# Patient Record
Sex: FEMALE | Race: WHITE
[De-identification: names, ages, dates, MRNs, and addresses within clinical notes are randomized per-mention and may not be internally consistent; named-entity substitution may affect disease eponyms.]

---
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
## 2025-01-14 ENCOUNTER — OFFICE VISIT (OUTPATIENT)
Dept: URGENT CARE | Facility: CLINIC | Age: 20
End: 2025-01-14
Payer: MEDICAID

## 2025-01-14 VITALS
BODY MASS INDEX: 21.66 KG/M2 | HEART RATE: 66 BPM | HEIGHT: 67 IN | RESPIRATION RATE: 16 BRPM | OXYGEN SATURATION: 100 % | SYSTOLIC BLOOD PRESSURE: 112 MMHG | WEIGHT: 138 LBS | TEMPERATURE: 98.3 F | DIASTOLIC BLOOD PRESSURE: 78 MMHG

## 2025-01-14 DIAGNOSIS — M54.50 ACUTE BILATERAL LOW BACK PAIN WITHOUT SCIATICA: ICD-10-CM
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
## 2025-01-14 DIAGNOSIS — S32.010D COMPRESSION FRACTURE OF L1 VERTEBRA WITH ROUTINE HEALING, SUBSEQUENT ENCOUNTER: ICD-10-CM

## 2025-01-14 PROCEDURE — 96372 THER/PROPH/DIAG INJ SC/IM: CPT

## 2025-01-14 PROCEDURE — 99214 OFFICE O/P EST MOD 30 MIN: CPT | Mod: 25

## 2025-01-14 RX ORDER — KETOROLAC TROMETHAMINE 15 MG/ML
15 INJECTION, SOLUTION INTRAMUSCULAR; INTRAVENOUS ONCE
Status: COMPLETED | OUTPATIENT
Start: 2025-01-14 | End: 2025-01-14

## 2025-01-14 RX ADMIN — KETOROLAC TROMETHAMINE 15 MG: 15 INJECTION, SOLUTION INTRAMUSCULAR; INTRAVENOUS at 11:05

## 2025-01-14 RX ADMIN — KETOROLAC TROMETHAMINE 15 MG: 15 INJECTION, SOLUTION INTRAMUSCULAR; INTRAVENOUS at 11:04

## 2025-01-14 NOTE — PROGRESS NOTES
Subjective     Milly Mcclain is a 19 y.o. female who presents with Follow-Up (Was seen one month ago for spine and is not better)    HPI:   Milly is a 20yo female presenting for ongoing back pain following a snowboarding injury. She was recently seen 12/16/24 and diagnosed with a compression fracture of L1 vertebra. She has not yet seen an orthopedic specialist. The pain is located in the generalized lower back region and does not radiate. The pain is relieved with positioning and aggravated by movement, bending over, and stooping. Denies prior injury to back. No loss of bladder or bowel control. Denies sensation loss in lower extremities. She has attempted ibuprofen and lidocaine patches for relief. No fever, vomiting, or shortness of breath.       Review of Systems   Constitutional:  Negative for fever.   Respiratory:  Negative for shortness of breath.    Gastrointestinal:  Negative for nausea and vomiting.   Genitourinary:  Negative for dysuria and flank pain.   Musculoskeletal:  Positive for back pain. Negative for neck pain.   Neurological:  Negative for dizziness, tingling, sensory change, focal weakness and weakness.     Past Medical History:   Diagnosis Date    Acute nasopharyngitis 05/2020    strep throat    Urinary bladder disorder     mom with chronic UTI    Urinary incontinence     bladder pain currently     Past Surgical History:   Procedure Laterality Date    TONSILLECTOMY AND ADENOIDECTOMY Bilateral 6/9/2020    Procedure: TONSILLECTOMY AND ADENOIDECTOMY;  Surgeon: Paola Alamo M.D.;  Location: SURGERY SAME DAY [facility];  Service: Ent     Patient has no known allergies.     Current Outpatient Medications:     ibuprofen (MOTRIN) 800 MG Tab, Take 1 Tablet by mouth every 8 hours as needed for Moderate Pain., Disp: 30 Tablet, Rfl: 0    lidocaine (ASPERFLEX) 4 % Patch, Apply patch to painful area. Patch may remain in place for up to 12 hours in any 24-hour period. No more than 1 patch can be  "used in a 24-hour period., Disp: 15 Patch, Rfl: 0    cyclobenzaprine (FLEXERIL) 5 mg tablet, Take 1-2 Tablets by mouth at bedtime as needed for Muscle Spasms., Disp: 15 Tablet, Rfl: 0     Social History     Tobacco Use    Smoking status: Never    Smokeless tobacco: Never   Vaping Use    Vaping status: Never Used   Substance Use Topics    Alcohol use: Never    Drug use: Yes     Types: Marijuana     History reviewed. No pertinent family history.     Medications, Allergies, and current problem list reviewed today in Epic.      Objective     /78 (BP Location: Left arm, Patient Position: Sitting, BP Cuff Size: Adult)   Pulse 66   Temp 36.8 °C (98.3 °F) (Temporal)   Resp 16   Ht 1.689 m (5' 6.5\")   Wt 62.6 kg (138 lb)   LMP 11/30/2024 (Exact Date)   SpO2 100%   BMI 21.94 kg/m²      Physical Exam  Vitals reviewed.   Constitutional:       General: She is not in acute distress.  HENT:      Nose: Nose normal.   Eyes:      General: Vision grossly intact. Gaze aligned appropriately. No visual field deficit.     Extraocular Movements: Extraocular movements intact.      Right eye: No nystagmus.      Left eye: No nystagmus.      Conjunctiva/sclera: Conjunctivae normal.      Pupils: Pupils are equal, round, and reactive to light.      Right eye: Pupil is round, reactive and not sluggish.      Left eye: Pupil is round, reactive and not sluggish.      Funduscopic exam:     Right eye: Red reflex present.         Left eye: Red reflex present.  Neck:      Trachea: Trachea normal. No abnormal tracheal secretions or tracheal deviation.   Cardiovascular:      Rate and Rhythm: Normal rate and regular rhythm.      Pulses: Normal pulses.           Popliteal pulses are 2+ on the right side and 2+ on the left side.        Dorsalis pedis pulses are 2+ on the right side and 2+ on the left side.        Posterior tibial pulses are 2+ on the right side and 2+ on the left side.      Heart sounds: Normal heart sounds.   Pulmonary:      " Effort: Pulmonary effort is normal. No tachypnea, accessory muscle usage, prolonged expiration, respiratory distress or retractions.      Breath sounds: Normal breath sounds. No stridor, decreased air movement or transmitted upper airway sounds. No wheezing, rhonchi or rales.   Musculoskeletal:         General: Tenderness present. No swelling or deformity.      Cervical back: Full passive range of motion without pain, normal range of motion and neck supple. No swelling, deformity, erythema, rigidity, spasms, tenderness, bony tenderness or crepitus. No pain with movement. Normal range of motion.      Thoracic back: No swelling, deformity, spasms, tenderness or bony tenderness. Normal range of motion.      Lumbar back: Tenderness and bony tenderness present. No swelling or deformity. Decreased range of motion.      Right hip: No deformity, tenderness, bony tenderness or crepitus. Normal range of motion. Normal strength.      Left hip: No deformity, tenderness, bony tenderness or crepitus. Normal range of motion. Normal strength.      Comments: Tenderness to palpation to lumbar region.    Strength 5/5 to bilateral lower extremities.   Distal neurovascular intact.      Skin:     General: Skin is warm and dry.      Findings: No bruising, erythema or rash.   Neurological:      General: No focal deficit present.      Mental Status: She is alert. Mental status is at baseline.      Cranial Nerves: Cranial nerves 2-12 are intact.      Sensory: Sensation is intact.      Motor: Motor function is intact.      Coordination: Coordination is intact.      Gait: Gait is intact.      Deep Tendon Reflexes: Reflexes are normal and symmetric.   Psychiatric:         Mood and Affect: Mood normal.         Behavior: Behavior normal.         Thought Content: Thought content normal.       DX-LUMBAR SPINE-2 OR 3 VIEWS    Result Date: 12/16/2024 12/16/2024 3:33 PM HISTORY/REASON FOR EXAM:  Pain Following Trauma. TECHNIQUE/ EXAM DESCRIPTION AND  NUMBER OF VIEWS:  3 views of the lumbar spine. COMPARISON: None. FINDINGS: Acute compression fracture of L1 with approximately 10% loss of height. The bony trabecular pattern is normal. Normal alignment.  The disc spaces are well maintained and symmetrical.  There is no evidence of spondylolisthesis or osseous lesion.  The posterior elements appear grossly normal on the limited series.     Acute compression fracture of L1 with approximately 10% loss of height.      Assessment & Plan     1. Compression fracture of L1 vertebra with routine healing, subsequent encounter     2. Accidental fall from snowboard     3. Acute bilateral low back pain without sciatica   - ketorolac (Toradol) 15 MG/ML injection 15 mg  - ketorolac (Toradol) 15 MG/ML injection 15 mg       MDM/Comments:   Patient presenting with ongoing back pain related to L1 fracture. Referral information for authorized referral to Orthopedics provided to patient with contact information.   Will treat pain with Toradol. Do not combine this with additional NSAIDs.    Treatment of as above and initial rest with no heavy lifting, stooping, or strenuous activity. Massage, ice and/or heat which ever feels better, and Tylenol per manufacture's instructions. Encouraged walking, stretching, and range of motion exercises as tolerated. Avoid sitting or laying down for long periods of time except for at night during sleep.   Patient is overall very well-appearing, no acute distress, and normal vital signs. Suspicions for acute emergent pathology are low.   Follow up with your PCP or return to urgent care if symptoms not improving in 1-2 weeks.       Illness progression and alarm symptoms discussed with patient, emphasizing low threshold for returning to clinic/emergency department for worsening symptoms. Patient is agreeable to the plan and verbalizes understanding, and will follow up if warranted.       Differential diagnosis, natural history, supportive care, and  indications for immediate follow-up discussed.      Follow-up as needed if symptoms worsen or fail to improve to PCP, Urgent care or Emergency Room.       I personally reviewed prior external notes and test results pertinent to today's visit.  I have independently reviewed and interpreted all diagnostics ordered during this urgent care visit.                                        Electronically signed by LESLIE Burch

## 2025-01-15 ASSESSMENT — ENCOUNTER SYMPTOMS
VOMITING: 0
BACK PAIN: 1
DIZZINESS: 0
NECK PAIN: 0
FEVER: 0
WEAKNESS: 0
FLANK PAIN: 0
SENSORY CHANGE: 0
TINGLING: 0
NAUSEA: 0
SHORTNESS OF BREATH: 0
FOCAL WEAKNESS: 0

## 2025-01-15 ASSESSMENT — VISUAL ACUITY: OU: 1

## 2025-02-23 ENCOUNTER — OFFICE VISIT (OUTPATIENT)
Dept: URGENT CARE | Facility: CLINIC | Age: 20
End: 2025-02-23
Payer: MEDICAID

## 2025-02-23 ENCOUNTER — APPOINTMENT (OUTPATIENT)
Dept: RADIOLOGY | Facility: IMAGING CENTER | Age: 20
End: 2025-02-23
Attending: PHYSICIAN ASSISTANT
Payer: MEDICAID

## 2025-02-23 VITALS
TEMPERATURE: 98.8 F | RESPIRATION RATE: 18 BRPM | OXYGEN SATURATION: 97 % | HEIGHT: 66 IN | WEIGHT: 146.6 LBS | DIASTOLIC BLOOD PRESSURE: 62 MMHG | BODY MASS INDEX: 23.56 KG/M2 | HEART RATE: 102 BPM | SYSTOLIC BLOOD PRESSURE: 118 MMHG

## 2025-02-23 DIAGNOSIS — M25.572 ACUTE LEFT ANKLE PAIN: ICD-10-CM

## 2025-02-23 DIAGNOSIS — S96.912A STRAIN OF LEFT ANKLE, INITIAL ENCOUNTER: ICD-10-CM

## 2025-02-23 PROCEDURE — 73610 X-RAY EXAM OF ANKLE: CPT | Mod: TC,LT | Performed by: RADIOLOGY

## 2025-02-23 PROCEDURE — 99213 OFFICE O/P EST LOW 20 MIN: CPT | Performed by: PHYSICIAN ASSISTANT

## 2025-02-23 PROCEDURE — 3078F DIAST BP <80 MM HG: CPT | Performed by: PHYSICIAN ASSISTANT

## 2025-02-23 PROCEDURE — 3074F SYST BP LT 130 MM HG: CPT | Performed by: PHYSICIAN ASSISTANT

## 2025-02-23 NOTE — LETTER
February 23, 2025         Patient: Milly Mcclain   YOB: 2005   Date of Visit: 2/23/2025           To Whom it May Concern:    Milly Mcclain was seen in my clinic on 2/23/2025.  Please excuse her from work 2/23, 2/24, and 2/25.  She may return to work on 2/26/2025 or sooner if her symptoms improve.    If you have any questions or concerns, please don't hesitate to call.        Sincerely,           Varsha Abraham P.A.-C.  Electronically Signed

## 2025-02-24 NOTE — PROGRESS NOTES
Subjective     Milly Mcclain is a 19 y.o. female who presents with Ankle Pain (Fractured Ankle back in late September pain started about 5 days ago )            HPI    This is a new problem.  The patient presents to clinic complaining of pain and swelling of the left ankle x 5 days.  The patient reports a history of a previous fracture to her left ankle in September 2024.  The patient states approximately 5 days ago she developed pain and swelling to the outer aspect of the left ankle.  The patient states she was skateboarding at the onset of her symptoms, but reports no known injury or trauma to her left ankle.  The patient reports increased pain with walking and weightbearing.  She also reports increased pain with range of motion.  The patient is taking OTC medications for her symptoms.    PMH:  has a past medical history of Acute nasopharyngitis (05/2020), Urinary bladder disorder, and Urinary incontinence.  MEDS:   Current Outpatient Medications:     lidocaine (ASPERFLEX) 4 % Patch, Apply patch to painful area. Patch may remain in place for up to 12 hours in any 24-hour period. No more than 1 patch can be used in a 24-hour period., Disp: 15 Patch, Rfl: 0    cyclobenzaprine (FLEXERIL) 5 mg tablet, Take 1-2 Tablets by mouth at bedtime as needed for Muscle Spasms., Disp: 15 Tablet, Rfl: 0  ALLERGIES: No Known Allergies  SURGHX:   Past Surgical History:   Procedure Laterality Date    TONSILLECTOMY AND ADENOIDECTOMY Bilateral 6/9/2020    Procedure: TONSILLECTOMY AND ADENOIDECTOMY;  Surgeon: Paola Alamo M.D.;  Location: SURGERY SAME DAY [facility];  Service: Ent     SOCHX:  reports that she has never smoked. She has never used smokeless tobacco. She reports current drug use. Drug: Marijuana. She reports that she does not drink alcohol.  FH: Family history was reviewed, no pertinent findings to report    Review of Systems   Musculoskeletal:  Positive for joint pain.              Objective     /62 (BP  "Location: Right arm, Patient Position: Sitting)   Pulse (!) 102   Temp 37.1 °C (98.8 °F) (Temporal)   Resp 18   Ht 1.676 m (5' 6\")   Wt 66.5 kg (146 lb 9.6 oz)   SpO2 97%   BMI 23.66 kg/m²      Physical Exam  Constitutional:       General: She is not in acute distress.     Appearance: Normal appearance. She is well-developed.   HENT:      Head: Normocephalic and atraumatic.      Right Ear: External ear normal.      Left Ear: External ear normal.      Nose: Nose normal.   Eyes:      Extraocular Movements: Extraocular movements intact.      Conjunctiva/sclera: Conjunctivae normal.   Cardiovascular:      Rate and Rhythm: Normal rate.   Pulmonary:      Effort: Pulmonary effort is normal.   Musculoskeletal:      Cervical back: Normal range of motion and neck supple.      Comments:   Left Ankle:  A localized area of tenderness is present to the lateral aspect of the left ankle inferior to the lateral malleolus with localized edema.  No additional tenderness of the left ankle.  No ecchymosis.  No overlying erythema.  No increased warmth.  No tenderness of the left foot.  No tenderness overlying the fifth metatarsal.  Decreased ROM -the patient demonstrates limited range of motion secondary to pain and swelling  Neurovascular intact distally  Strength 5/5 -dorsiflexion/plantarflexion of the left ankle/foot against resistance  Antalgic gait   Skin:     General: Skin is warm and dry.   Neurological:      Mental Status: She is alert and oriented to person, place, and time.               Progress:  Left Ankle XR:  COMPARISON: None.     FINDINGS:     BONE MINERALIZATION: Normal.  JOINTS: Preserved. No erosions.  FRACTURE: None.  DISLOCATION: None.  SOFT TISSUES: No mass.     IMPRESSION:  No acute osseous abnormality.                  Assessment & Plan  Acute left ankle pain    Orders:    DX-ANKLE 3+ VIEWS LEFT; Future    Strain of left ankle, initial encounter            Differential diagnoses, supportive care, and " indications for immediate follow-up discussed with patient.   Instructed to return to clinic or nearest emergency department for any change in condition, further concerns, or worsening of symptoms.    OTC NSAIDs for pain/discomfort   RICE  Wear brace for additional support  Weight-bearing as tolerated  Follow-up with PCP   Return to clinic or go tot he ED if symptoms worsen or fail to improve, or if the patient should develop worsening/increasing pain/tenderness, swelling, bruising, redness or warmth to the affected area, decreased ROM, numbness, tingling or weakness, difficulty walking, fever/chills, and/or any concerning symptoms.     Discussed plan with the patient, and she agrees to the above.     I personally reviewed prior external notes and test results pertinent to today's visit.  I have independently reviewed and interpreted all diagnostics ordered during this urgent care visit.     Please note that this dictation was created using voice recognition software. I have made every reasonable attempt to correct obvious errors, but I expect that there may be errors of grammar and possibly content that I did not discover before finalizing the note.     This note was electronically signed by Varsha Abraham PA-C

## 2025-06-02 ENCOUNTER — OFFICE VISIT (OUTPATIENT)
Dept: URGENT CARE | Facility: CLINIC | Age: 20
End: 2025-06-02
Payer: MEDICAID

## 2025-06-02 ENCOUNTER — RESULTS FOLLOW-UP (OUTPATIENT)
Dept: URGENT CARE | Facility: CLINIC | Age: 20
End: 2025-06-02

## 2025-06-02 VITALS
HEART RATE: 82 BPM | TEMPERATURE: 97.2 F | SYSTOLIC BLOOD PRESSURE: 115 MMHG | HEIGHT: 67 IN | WEIGHT: 145 LBS | DIASTOLIC BLOOD PRESSURE: 72 MMHG | BODY MASS INDEX: 22.76 KG/M2 | OXYGEN SATURATION: 96 % | RESPIRATION RATE: 18 BRPM

## 2025-06-02 DIAGNOSIS — Z20.818 STREPTOCOCCUS GROUP A EXPOSURE: Primary | ICD-10-CM

## 2025-06-02 LAB — S PYO DNA SPEC NAA+PROBE: NOT DETECTED

## 2025-06-02 PROCEDURE — 3078F DIAST BP <80 MM HG: CPT

## 2025-06-02 PROCEDURE — 99213 OFFICE O/P EST LOW 20 MIN: CPT

## 2025-06-02 PROCEDURE — 3074F SYST BP LT 130 MM HG: CPT

## 2025-06-02 PROCEDURE — 87651 STREP A DNA AMP PROBE: CPT

## 2025-06-02 RX ORDER — AMOXICILLIN 500 MG/1
1000 CAPSULE ORAL DAILY
Qty: 20 CAPSULE | Refills: 0 | Status: SHIPPED | OUTPATIENT
Start: 2025-06-02 | End: 2025-06-12

## 2025-06-05 ASSESSMENT — ENCOUNTER SYMPTOMS
COUGH: 0
FEVER: 0
ABDOMINAL PAIN: 0
SHORTNESS OF BREATH: 0
SORE THROAT: 1
NAUSEA: 0
VOMITING: 0
SWOLLEN GLANDS: 1
DIARRHEA: 0
CHILLS: 0
MYALGIAS: 0
HEADACHES: 0

## 2025-06-05 NOTE — PROGRESS NOTES
"Subjective:   Milly Mcclain is a 19 y.o. female who presents for Pharyngitis (Possible strep, symptoms for a week now )      Pharyngitis   The current episode started 1 to 4 weeks ago. The problem has been gradually worsening. Neither side of throat is experiencing more pain than the other. There has been no fever. Associated symptoms include swollen glands. Pertinent negatives include no abdominal pain, congestion, coughing, diarrhea, ear pain, headaches, shortness of breath or vomiting. She has had exposure to strep. She has had no exposure to mono.       Review of Systems   Constitutional:  Negative for chills, fever and malaise/fatigue.   HENT:  Positive for sore throat. Negative for congestion, ear pain and hearing loss.    Respiratory:  Negative for cough and shortness of breath.    Cardiovascular:  Negative for chest pain.   Gastrointestinal:  Negative for abdominal pain, diarrhea, nausea and vomiting.   Genitourinary:  Negative for dysuria.   Musculoskeletal:  Negative for myalgias.   Skin:  Negative for rash.   Neurological:  Negative for headaches.       Medications, Allergies, and current problem list reviewed today in Epic.     Objective:     /72 (BP Location: Left arm, Patient Position: Sitting, BP Cuff Size: Adult)   Pulse 82   Temp 36.2 °C (97.2 °F) (Temporal)   Resp 18   Ht 1.702 m (5' 7\")   Wt 65.8 kg (145 lb)   SpO2 96%     Physical Exam  Vitals and nursing note reviewed.   Constitutional:       General: She is not in acute distress.     Appearance: Normal appearance. She is not ill-appearing.   HENT:      Head: Normocephalic and atraumatic.      Right Ear: Tympanic membrane normal.      Left Ear: Tympanic membrane normal.      Nose: Nose normal.      Mouth/Throat:      Mouth: Mucous membranes are moist.      Pharynx: Uvula midline. Posterior oropharyngeal erythema present. No pharyngeal swelling, oropharyngeal exudate, uvula swelling or postnasal drip.      Tonsils: No tonsillar " exudate or tonsillar abscesses. 0 on the right. 0 on the left.   Eyes:      Conjunctiva/sclera: Conjunctivae normal.   Cardiovascular:      Rate and Rhythm: Normal rate.      Heart sounds: Normal heart sounds.   Pulmonary:      Effort: Pulmonary effort is normal.   Abdominal:      General: Abdomen is flat.      Palpations: Abdomen is soft.   Musculoskeletal:         General: Normal range of motion.      Cervical back: Normal range of motion.   Skin:     General: Skin is warm and dry.      Capillary Refill: Capillary refill takes less than 2 seconds.   Neurological:      Mental Status: She is alert and oriented to person, place, and time.   Psychiatric:         Mood and Affect: Mood normal.         Behavior: Behavior normal.       Results for orders placed or performed in visit on 06/02/25   POCT GROUP A STREP, PCR    Collection Time: 06/02/25  1:54 PM   Result Value Ref Range    POC Group A Strep, PCR Not Detected Not Detected, Invalid       Assessment/Plan:       1. Streptococcus group A exposure  POCT GROUP A STREP, PCR    amoxicillin (AMOXIL) 500 MG Cap        After assessment patient seen in office with significant other who did test positive for strep.  Strep swab for patient was negative.  Due to patient's close contact and current symptoms I did provide patient a course of amoxicillin.  Patient instructed to take as prescribed.  Patient instructed to monitor for any worsening signs and symptoms of any other concerns patient was instructed return to urgent care for reevaluation.    Differential diagnosis, natural history, and supportive care discussed. We also reviewed side effects of medication including allergic response, GI upset, tendon injury, rash, sedation etc. Patient and/or guardian voices understanding.      Advised the patient to follow-up with the primary care physician for recheck, reevaluation, and consideration of further management.    I personally reviewed prior external notes and test results  pertinent to today's visit as well as additional imaging and testing completed in clinic today.     Please note that this dictation was created using voice recognition software. I have made every reasonable attempt to correct obvious errors, but I expect that there are errors of grammar and possibly content that I did not discover before finalizing the note.    This note was electronically signed by RODOLFO Smith

## (undated) DEVICE — CATHETER FOLEY ROBINSON 10FR 16IN STRL (12EA/CA)

## (undated) DEVICE — PROTECTOR ULNA NERVE - (36PR/CA)

## (undated) DEVICE — ELECTRODE DUAL RETURN W/ CORD - (50/PK)

## (undated) DEVICE — ANTI-FOG SOLUTION - 60BTL/CA

## (undated) DEVICE — CANISTER SUCTION 3000ML MECHANICAL FILTER AUTO SHUTOFF MEDI-VAC NONSTERILE LF DISP  (40EA/CA)

## (undated) DEVICE — KIT  I.V. START (100EA/CA)

## (undated) DEVICE — SENSOR SPO2 ADULT LNCS ADTX (20/BX) ORDER ITEM #19593

## (undated) DEVICE — MANIFOLD NEPTUNE 1 PORT (20/PK)

## (undated) DEVICE — BOVIE FOOT CONTROL SUCTION - 6IN 10FR (25EA/CA)

## (undated) DEVICE — SODIUM CHL IRRIGATION 0.9% 1000ML (12EA/CA)

## (undated) DEVICE — ELECTRODE 850 FOAM ADHESIVE - HYDROGEL RADIOTRNSPRNT (50/PK)

## (undated) DEVICE — BOVIE BLADE COATED &INSULATED - 25/PK

## (undated) DEVICE — TUBE CONNECTING SUCTION - CLEAR PLASTIC STERILE 72 IN (50EA/CA)

## (undated) DEVICE — SUTURE GENERAL

## (undated) DEVICE — PENCIL ELECTSURG 10FT BTN SWH - (50/CA)

## (undated) DEVICE — GOWN SURGEONS LARGE - (32/CA)

## (undated) DEVICE — LACTATED RINGERS INJ 1000 ML - (14EA/CA 60CA/PF)

## (undated) DEVICE — SET LEADWIRE 5 LEAD BEDSIDE DISPOSABLE ECG (1SET OF 5/EA)

## (undated) DEVICE — SET EXTENSION WITH 2 PORTS (48EA/CA) ***PART #2C8610 IS A SUBSTITUTE*****

## (undated) DEVICE — GLOVE SZ 6.5 BIOGEL PI MICRO - PF LF (50PR/BX)

## (undated) DEVICE — PACK ENT OR - (2EA/CA)

## (undated) DEVICE — SUCTION INSTRUMENT YANKAUER BULBOUS TIP W/O VENT (50EA/CA)

## (undated) DEVICE — SPONGE TONSIL MEDIUM XRAY STERILE 1 - (5/PK 20PK/CA)"

## (undated) DEVICE — HEAD HOLDER JUNIOR/ADULT

## (undated) DEVICE — TUBE TRACHEAL ORAL 6.0 CUFFED - (10EA/PK)

## (undated) DEVICE — CANISTER SUCTION RIGID RED 1500CC (40EA/CA)

## (undated) DEVICE — TUBING CLEARLINK DUO-VENT - C-FLO (48EA/CA)

## (undated) DEVICE — CATHETER IV 20 GA X 1-1/4 ---SURG.& SDS ONLY--- (50EA/BX)